# Patient Record
Sex: FEMALE | Race: WHITE
[De-identification: names, ages, dates, MRNs, and addresses within clinical notes are randomized per-mention and may not be internally consistent; named-entity substitution may affect disease eponyms.]

---

## 2018-11-23 ENCOUNTER — HOSPITAL ENCOUNTER (OUTPATIENT)
Dept: HOSPITAL 89 - MAMO | Age: 53
End: 2018-11-23
Attending: NURSE PRACTITIONER
Payer: COMMERCIAL

## 2018-11-23 DIAGNOSIS — Z80.3: ICD-10-CM

## 2018-11-23 DIAGNOSIS — Z12.31: Primary | ICD-10-CM

## 2018-11-23 PROCEDURE — 77063 BREAST TOMOSYNTHESIS BI: CPT

## 2018-11-23 PROCEDURE — 77067 SCR MAMMO BI INCL CAD: CPT

## 2018-11-23 NOTE — RADIOLOGY IMAGING REPORT
FACILITY: [facility] - [place] 

 

PATIENT NAME: LASHAE MCKINLEY

: [id number]

MR: [id number]

V: [id number]

EXAM DATE: [id number]

ORDERING PHYSICIAN: MARTHA GOULD

TECHNOLOGIST: Penelope Ryan

 

PROCEDURE:BILATERAL DIGITAL SCREENING MAMMOGRAM WITH CAD ASSISTED 

INTERPRETATION & 3D TOMOSYNTHESIS

 

COMPARISON:Prior mammograms dated 17, 16, 3/17/15, 4/15/14, 

10/10/13, 3/2/12

 

INDICATIONS:SCREENING

 

FINDINGS:  

A small amount of fibroglandular tissue is seen throughout the breasts. 

The parenchymal pattern has remained stable allowing for difference in 

mammographic technique & patient positioning. There is no evidence of 

malignant appearing mass, malignant appearing calcification or other 

secondary sign of malignancy in either breast.

 

DIAGNOSTIC CATEGORY 1--NEGATIVE.  

 

RECOMMENDATIONS:

ROUTINE MAMMOGRAM AND CLINICAL EVALUATION.   

 

IMPRESSION: 

BIRADS 1: Negative.

No significant abnormality is seen.

 

 

 

 

 

 

 

 

 

 

 

Dictated by:  Michelle Mohan M.D. on 2018 at 8:55   

Transcribed by: TRISTAN on 2018 at 13:23    

Approved by:  Michelle Mohan M.D. on 2018 at 13:48   

[facility]